# Patient Record
Sex: MALE | Race: BLACK OR AFRICAN AMERICAN | Employment: FULL TIME | URBAN - METROPOLITAN AREA
[De-identification: names, ages, dates, MRNs, and addresses within clinical notes are randomized per-mention and may not be internally consistent; named-entity substitution may affect disease eponyms.]

---

## 2018-01-29 ENCOUNTER — HOSPITAL ENCOUNTER (EMERGENCY)
Age: 58
Discharge: HOME | End: 2018-01-29
Attending: EMERGENCY MEDICINE
Payer: MEDICAID

## 2018-01-29 VITALS
TEMPERATURE: 98.1 F | BODY MASS INDEX: 22.76 KG/M2 | SYSTOLIC BLOOD PRESSURE: 111 MMHG | HEART RATE: 84 BPM | WEIGHT: 145 LBS | RESPIRATION RATE: 16 BRPM | OXYGEN SATURATION: 100 % | DIASTOLIC BLOOD PRESSURE: 75 MMHG | HEIGHT: 67 IN

## 2018-01-29 DIAGNOSIS — J06.9 UPPER RESPIRATORY TRACT INFECTION, UNSPECIFIED TYPE: ICD-10-CM

## 2018-01-29 DIAGNOSIS — K08.89 PAIN, DENTAL: Primary | ICD-10-CM

## 2018-01-29 PROCEDURE — 99282 EMERGENCY DEPT VISIT SF MDM: CPT

## 2018-01-29 RX ORDER — IBUPROFEN 800 MG/1
800 TABLET ORAL EVERY 8 HOURS PRN
Qty: 30 TABLET | Refills: 0 | Status: SHIPPED | OUTPATIENT
Start: 2018-01-29 | End: 2019-03-29 | Stop reason: ALTCHOICE

## 2018-01-29 RX ORDER — BENZONATATE 100 MG/1
100 CAPSULE ORAL 3 TIMES DAILY PRN
Qty: 30 CAPSULE | Refills: 0 | Status: SHIPPED | OUTPATIENT
Start: 2018-01-29 | End: 2018-02-05

## 2018-01-29 RX ORDER — PSEUDOEPHEDRINE HYDROCHLORIDE 30 MG/1
30 TABLET ORAL EVERY 6 HOURS PRN
Qty: 20 TABLET | Refills: 0 | Status: SHIPPED | OUTPATIENT
Start: 2018-01-29 | End: 2018-02-05

## 2018-01-29 ASSESSMENT — ENCOUNTER SYMPTOMS
SINUS PRESSURE: 1
STRIDOR: 0
PHOTOPHOBIA: 0
SINUS PAIN: 1
DIARRHEA: 0
CONSTIPATION: 0
COUGH: 1
VOMITING: 0
SHORTNESS OF BREATH: 0
NAUSEA: 0
RHINORRHEA: 1
SORE THROAT: 0
ABDOMINAL PAIN: 0
WHEEZING: 0
VOICE CHANGE: 0

## 2018-01-29 ASSESSMENT — PAIN SCALES - GENERAL: PAINLEVEL_OUTOF10: 7

## 2018-01-29 ASSESSMENT — PAIN DESCRIPTION - PAIN TYPE: TYPE: ACUTE PAIN

## 2018-01-29 ASSESSMENT — PAIN DESCRIPTION - PROGRESSION: CLINICAL_PROGRESSION: GRADUALLY WORSENING

## 2018-01-29 ASSESSMENT — PAIN DESCRIPTION - ORIENTATION: ORIENTATION: LEFT

## 2018-01-29 ASSESSMENT — PAIN DESCRIPTION - LOCATION: LOCATION: MOUTH

## 2018-01-29 ASSESSMENT — PAIN DESCRIPTION - DESCRIPTORS: DESCRIPTORS: ACHING

## 2018-01-29 ASSESSMENT — PAIN DESCRIPTION - ONSET: ONSET: PROGRESSIVE

## 2018-01-29 NOTE — ED NOTES
[address] [facility]  [address] [facility]  Phone: [phone]  Fax: [phone]    Patient Referral Fax     To:  Patient Referral Coordinator Fax:  [phone]  Referral from:  [facility]  56 y.o.      [phone] (home)      Additional Notes: Tuesday, Jan. 30th @ 0900    Signature: Cynthia Koroma Date: 1/29/18         Amador Coles RN  01/29/18 8098

## 2018-01-29 NOTE — ED PROVIDER NOTES
[address] [facility]     Emergency Department     Faculty Attestation    I performed a history and physical examination of the patient and discussed management with the resident. I reviewed the residents note and agree with the documented findings and plan of care. Any areas of disagreement are noted on the chart. I was personally present for the key portions of any procedures. I have documented in the chart those procedures where I was not present during the key portions. I have reviewed the emergency nurses triage note. I agree with the chief complaint, past medical history, past surgical history, allergies, medications, social and family history as documented unless otherwise noted below. For Physician Assistant/ Nurse Practitioner cases/documentation I have personally evaluated this patient and have completed at least one if not all key elements of the E/M (history, physical exam, and MDM). Additional findings are as noted. Primary Care Physician:  No primary care provider on file. CHIEF COMPLAINT       Chief Complaint   Patient presents with    Dental Pain     pt with c/o left upper dental pain.      Cough     pt c/o productive cough with nasal congestion       RECENT VITALS:   Temp: 98.1 °F (36.7 °C),  Pulse: 84, Resp: 16, BP: 111/75    LABS:  Labs Reviewed - No data to display      PERTINENT ATTENDING PHYSICIAN COMMENTS:    Patient here with dental pain patient has a partially impacted left lower wisdom tooth, no evidence of obvious infection for the mouth is soft he also has cold symptoms     Critical Care          Adeline Lundborg, MD, [facility]  Attending Emergency  Physician              Adeline Lundborg, MD  01/29/18 7639
and wound. Neurological: Negative for dizziness, facial asymmetry, weakness, light-headedness and headaches. PHYSICAL EXAM   (up to 7 for level 4, 8 or more for level 5)      INITIAL VITALS:   /75   Pulse 84   Temp 98.1 °F (36.7 °C) (Oral)   Resp 16   Ht 5' 7\" (1.702 m)   Wt 145 lb (65.8 kg)   SpO2 100%   BMI 22.71 kg/m²     Physical Exam   Constitutional: He is oriented to person, place, and time. He appears well-developed and well-nourished. No distress. HENT:   Head: Normocephalic and atraumatic. Mouth/Throat: Uvula is midline and oropharynx is clear and moist. No oropharyngeal exudate, posterior oropharyngeal edema or posterior oropharyngeal erythema. First molar of the left lower mouth appears to be erupting from the gums and appears to be impacted. No evidence of dental abscess. Patient has tenderness palpation over the maxillary sinuses bilaterally. Eyes: Conjunctivae and EOM are normal. Pupils are equal, round, and reactive to light. Neck: Normal range of motion. Neck supple. Cardiovascular: Normal rate, regular rhythm and normal heart sounds. No murmur heard. Pulmonary/Chest: Effort normal and breath sounds normal. No respiratory distress. He has no wheezes. Abdominal: Soft. He exhibits no distension. There is no tenderness. There is no rebound and no guarding. Neurological: He is alert and oriented to person, place, and time. Skin: He is not diaphoretic. DIFFERENTIAL  DIAGNOSIS     PLAN (LABS / IMAGING / EKG):  No orders of the defined types were placed in this encounter.       MEDICATIONS ORDERED:  Orders Placed This Encounter   Medications    pseudoephedrine (DECONGESTANT) 30 MG tablet     Sig: Take 1 tablet by mouth every 6 hours as needed for Congestion     Dispense:  20 tablet     Refill:  0    ibuprofen (ADVIL;MOTRIN) 800 MG tablet     Sig: Take 1 tablet by mouth every 8 hours as needed for Pain     Dispense:  30 tablet     Refill:  0   

## 2018-03-13 ENCOUNTER — HOSPITAL ENCOUNTER (EMERGENCY)
Age: 58
Discharge: HOME | End: 2018-03-13
Attending: EMERGENCY MEDICINE
Payer: MEDICAID

## 2018-03-13 VITALS
DIASTOLIC BLOOD PRESSURE: 66 MMHG | WEIGHT: 145 LBS | BODY MASS INDEX: 22.76 KG/M2 | SYSTOLIC BLOOD PRESSURE: 107 MMHG | HEART RATE: 60 BPM | TEMPERATURE: 97 F | OXYGEN SATURATION: 99 % | HEIGHT: 67 IN | RESPIRATION RATE: 18 BRPM

## 2018-03-13 DIAGNOSIS — R09.81 NASAL CONGESTION: Primary | ICD-10-CM

## 2018-03-13 PROCEDURE — 99282 EMERGENCY DEPT VISIT SF MDM: CPT

## 2018-03-13 RX ORDER — FLUTICASONE PROPIONATE 50 MCG
1 SPRAY, SUSPENSION (ML) NASAL DAILY
Qty: 1 BOTTLE | Refills: 3 | Status: SHIPPED | OUTPATIENT
Start: 2018-03-13 | End: 2019-03-29 | Stop reason: ALTCHOICE

## 2018-03-13 ASSESSMENT — ENCOUNTER SYMPTOMS
ABDOMINAL PAIN: 0
COLOR CHANGE: 0
CONSTIPATION: 0
RHINORRHEA: 1
COUGH: 0
SINUS PRESSURE: 1
NAUSEA: 0
APNEA: 0
SORE THROAT: 0
VOMITING: 0
SHORTNESS OF BREATH: 0
DIARRHEA: 0

## 2018-03-13 NOTE — ED PROVIDER NOTES
[address] [facility]  ED  Emergency Department Encounter  Emergency Medicine Resident     Pt Name: Isabell Helm  MRN: [id number]  [place] 1960  Date of evaluation: 3/13/18  PCP:  No primary care provider on file. CHIEF COMPLAINT       Chief Complaint   Patient presents with    Nasal Congestion     green drainage x1 month       HISTORY OF PRESENT ILLNESS  (Location/Symptom, Timing/Onset, Context/Setting, Quality, Duration, Modifying Factors, Severity.)      Isabell Helm is a 56 y.o. male who presents with Green nasal drainage and intermittent for approximately one month. He states that it initially started as a cold in the way of any recurrent cold and the now is having drainage approximate 2 weeks since the cold. He states that it is green in nature and he has some pressure within his nasal bridge. He states that the drainage is malodorous. He denies any fevers or chills. Denies any abdominal pain or nausea or vomiting. Denies any cough, shortness of breath, or chest pain. PAST MEDICAL / SURGICAL / SOCIAL / FAMILY HISTORY     Denies any significant past medical or past surgical history    Social History     Social History    Marital status: Single     Spouse name: N/A    Number of children: N/A    Years of education: N/A     Occupational History    Not on file. Social History Main Topics    Smoking status: Current Every Day Smoker     Packs/day: 0.25     Types: Cigarettes    Smokeless tobacco: Not on file    Alcohol use Yes      Comment: socially    Drug use: Yes     Types: Marijuana    Sexual activity: Not on file     Other Topics Concern    Not on file     Social History Narrative    No narrative on file       History reviewed. No pertinent family history. Allergies:  Patient has no known allergies. Home Medications:  Prior to Admission medications    Medication Sig Start Date End Date Taking?  Authorizing Provider   fluticasone (FLONASE) 50 MCG/ACT nasal instructions. PROCEDURES:  None    CONSULTS:  None    CRITICAL CARE:  Please see attending note    FINAL IMPRESSION      1.  Nasal congestion        DISPOSITION / PLAN     DISPOSITION Decision To Discharge 03/13/2018 12:50:38 AM      PATIENT REFERRED TO:  [facility]  [address] [facility] [address]  [phone]  Go to   As needed, If symptoms worsen      DISCHARGE MEDICATIONS:  Discharge Medication List as of 3/13/2018 12:54 AM      START taking these medications    Details   fluticasone (FLONASE) 50 MCG/ACT nasal spray 1 spray by Nasal route daily, Disp-1 Bottle, R-3Print             Denzel Evangelista MD  Emergency Medicine Resident    (Please note that portions of this note were completed with a voice recognition program.  Efforts were made to edit the dictations but occasionally words are mis-transcribed.)       Denzel Evangelista MD  Resident  03/13/18 1142

## 2018-03-13 NOTE — ED PROVIDER NOTES
[facility]     Emergency Department     Faculty Attestation    I performed a history and physical examination of the patient and discussed management with the resident. I have reviewed and agree with the residents findings including all diagnostic interpretations, and treatment plans as written. Any areas of disagreement are noted on the chart. I was personally present for the key portions of any procedures. I have documented in the chart those procedures where I was not present during the key portions. I have reviewed the emergency nurses triage note. I agree with the chief complaint, past medical history, past surgical history, allergies, medications, social and family history as documented unless otherwise noted below. Documentation of the HPI, Physical Exam and Medical Decision Making performed by scribmalcolm is based on my personal performance of the HPI, PE and MDM. For Physician Assistant/ Nurse Practitioner cases/documentation I have personally evaluated this patient and have completed at least one if not all key elements of the E/M (history, physical exam, and MDM). Additional findings are as noted. 56-year-old male complaining of nasal congestion, green drainage. No fever, no vomiting. PE vss, gcs 15, TM clear bilaterally, neck supple, posterior pharynx clear, mild nasal mucosa injection, no mucus/drainage,   Pt appears quite well,       Pre-hypertension/Hypertension: The patient has been informed that they may have pre-hypertension or Hypertension based on a blood pressure reading in the emergency department. I recommend that the patient call the primary care provider listed on their discharge instructions or a physician of their choice this week to arrange follow up for further evaluation of possible pre-hypertension or Hypertension.       EKG Interpretation    Interpreted by me      CRITICAL CARE: There was a high probability of clinically

## 2019-03-29 ENCOUNTER — HOSPITAL ENCOUNTER (EMERGENCY)
Age: 59
Discharge: HOME | End: 2019-03-29
Attending: EMERGENCY MEDICINE

## 2019-03-29 ENCOUNTER — APPOINTMENT (OUTPATIENT)
Dept: GENERAL RADIOLOGY | Age: 59
End: 2019-03-29

## 2019-03-29 VITALS
HEART RATE: 82 BPM | WEIGHT: 147 LBS | HEIGHT: 68 IN | BODY MASS INDEX: 22.28 KG/M2 | TEMPERATURE: 98.9 F | SYSTOLIC BLOOD PRESSURE: 113 MMHG | DIASTOLIC BLOOD PRESSURE: 72 MMHG | OXYGEN SATURATION: 99 % | RESPIRATION RATE: 14 BRPM

## 2019-03-29 DIAGNOSIS — J18.9 ATYPICAL PNEUMONIA: Primary | ICD-10-CM

## 2019-03-29 DIAGNOSIS — J32.0 CHRONIC MAXILLARY SINUSITIS: ICD-10-CM

## 2019-03-29 LAB
DIRECT EXAM: NORMAL
Lab: NORMAL
SPECIMEN DESCRIPTION: NORMAL

## 2019-03-29 PROCEDURE — 71046 X-RAY EXAM CHEST 2 VIEWS: CPT

## 2019-03-29 PROCEDURE — 6370000000 HC RX 637 (ALT 250 FOR IP): Performed by: EMERGENCY MEDICINE

## 2019-03-29 PROCEDURE — 87804 INFLUENZA ASSAY W/OPTIC: CPT

## 2019-03-29 PROCEDURE — 99283 EMERGENCY DEPT VISIT LOW MDM: CPT

## 2019-03-29 RX ORDER — ACETAMINOPHEN 500 MG
500 TABLET ORAL EVERY 6 HOURS PRN
COMMUNITY
End: 2019-03-29 | Stop reason: CLARIF

## 2019-03-29 RX ORDER — AZITHROMYCIN 250 MG/1
TABLET, FILM COATED ORAL
Qty: 1 PACKET | Refills: 0 | Status: SHIPPED | OUTPATIENT
Start: 2019-03-29 | End: 2019-04-02

## 2019-03-29 RX ORDER — ONDANSETRON 4 MG/1
4 TABLET, ORALLY DISINTEGRATING ORAL EVERY 8 HOURS PRN
Qty: 20 TABLET | Refills: 0 | Status: SHIPPED | OUTPATIENT
Start: 2019-03-29 | End: 2019-03-29

## 2019-03-29 RX ORDER — NAPROXEN 250 MG/1
500 TABLET ORAL ONCE
Status: COMPLETED | OUTPATIENT
Start: 2019-03-29 | End: 2019-03-29

## 2019-03-29 RX ORDER — ALBUTEROL SULFATE 90 UG/1
2 AEROSOL, METERED RESPIRATORY (INHALATION) EVERY 4 HOURS PRN
Qty: 1 INHALER | Refills: 0 | Status: SHIPPED | OUTPATIENT
Start: 2019-03-29 | End: 2019-03-29

## 2019-03-29 RX ORDER — NAPROXEN 375 MG/1
375 TABLET ORAL 3 TIMES DAILY PRN
Qty: 20 TABLET | Refills: 0 | Status: SHIPPED | OUTPATIENT
Start: 2019-03-29 | End: 2019-03-29

## 2019-03-29 RX ORDER — ONDANSETRON 4 MG/1
4 TABLET, FILM COATED ORAL EVERY 8 HOURS PRN
Qty: 10 TABLET | Refills: 0 | Status: SHIPPED | OUTPATIENT
Start: 2019-03-29 | End: 2019-04-18

## 2019-03-29 RX ORDER — ONDANSETRON 4 MG/1
4 TABLET, ORALLY DISINTEGRATING ORAL ONCE
Status: COMPLETED | OUTPATIENT
Start: 2019-03-29 | End: 2019-03-29

## 2019-03-29 RX ADMIN — NAPROXEN 500 MG: 250 TABLET ORAL at 14:50

## 2019-03-29 RX ADMIN — ONDANSETRON 4 MG: 4 TABLET, ORALLY DISINTEGRATING ORAL at 14:50

## 2019-03-29 ASSESSMENT — ENCOUNTER SYMPTOMS
SHORTNESS OF BREATH: 1
ABDOMINAL PAIN: 0
SINUS PRESSURE: 1
BACK PAIN: 0
VOMITING: 0
COUGH: 1
NAUSEA: 1
TROUBLE SWALLOWING: 0

## 2019-03-29 ASSESSMENT — PAIN DESCRIPTION - DESCRIPTORS: DESCRIPTORS: ACHING

## 2019-03-29 ASSESSMENT — PAIN DESCRIPTION - PAIN TYPE: TYPE: ACUTE PAIN

## 2019-03-29 ASSESSMENT — PAIN DESCRIPTION - LOCATION: LOCATION: GENERALIZED

## 2019-03-29 ASSESSMENT — PAIN SCALES - GENERAL
PAINLEVEL_OUTOF10: 6
PAINLEVEL_OUTOF10: 6

## 2019-03-29 NOTE — ED PROVIDER NOTES
[address]  eMERGENCY dEPARTMENT eNCOUnter    Pt Name: Javier Jovel  MRN: [id number]  [place] 1960  Date of evaluation: 3/29/19  CHIEF COMPLAINT       Chief Complaint   Patient presents with    Fever    Neck Pain    Generalized Body Aches     HISTORY OF PRESENT ILLNESS   HPI  66 y.o. male presents for evaluation of upper respiratory symptoms. Patient states she's had about 4 days of sore throat, persistent cough, shortness of breath, headaches, sinus pressure, nausea. He is worried he has \"walking pneumonia\" or \"head cold\". He endorses chills and sweating, but unsure of these had a fever or not. He denies any actual vomiting or abdominal pain. She denies any neck pain or stiffness. He has been taking ibuprofen which helps temporarily. REVIEW OF SYSTEMS     Review of Systems   Constitutional: Positive for appetite change, chills, diaphoresis and fatigue. Negative for fever. HENT: Positive for congestion, ear pain and sinus pressure. Negative for trouble swallowing. Respiratory: Positive for cough and shortness of breath. Cardiovascular: Negative for chest pain. Gastrointestinal: Positive for nausea. Negative for abdominal pain and vomiting. Musculoskeletal: Positive for myalgias. Negative for back pain, neck pain and neck stiffness. Skin: Negative for rash. Allergic/Immunologic: Negative for immunocompromised state. Neurological: Negative for light-headedness. Psychiatric/Behavioral: Negative for agitation. PASTMEDICAL HISTORY     Past Medical History:   Diagnosis Date    No active medical problems      SURGICAL HISTORY     History reviewed. No pertinent surgical history. CURRENT MEDICATIONS       Discharge Medication List as of 3/29/2019  3:32 PM      CONTINUE these medications which have NOT CHANGED    Details   Ibuprofen (MOTRIN PO) Take by mouthHistorical Med           ALLERGIES     has No Known Allergies.   FAMILY HISTORY     has no family status information on file. SOCIAL HISTORY       Social History     Tobacco Use    Smoking status: Current Every Day Smoker     Types: Cigarettes    Smokeless tobacco: Never Used   Substance Use Topics    Alcohol use: Not Currently    Drug use: Yes     Types: Marijuana     PHYSICAL EXAM     INITIAL VITALS: /72   Pulse 82   Temp 98.9 °F (37.2 °C) (Oral)   Resp 14   Ht 5' 8\" (1.727 m)   Wt 147 lb (66.7 kg)   SpO2 99%   BMI 22.35 kg/m²    Physical Exam   Constitutional: He is oriented to person, place, and time. He appears well-developed and well-nourished. HENT:   Head: Normocephalic and atraumatic. Right Ear: External ear normal.   Left Ear: External ear normal.   Mouth/Throat: Oropharynx is clear and moist. No oropharyngeal exudate. Very mild amount of erythema in the posterior pharynx without any edema or exudate   Eyes: Pupils are equal, round, and reactive to light. Neck: Normal range of motion. Neck supple. Cardiovascular: Normal rate and regular rhythm. Pulmonary/Chest: Effort normal and breath sounds normal. No respiratory distress. He has no wheezes. Abdominal: Soft. He exhibits no distension. There is no tenderness. Neurological: He is alert and oriented to person, place, and time. Skin: Skin is warm. Capillary refill takes less than 2 seconds. Psychiatric: He has a normal mood and affect. Nursing note and vitals reviewed. MEDICAL DECISION MAKING:     Patient presents with a constellation of symptoms including cough, sinus pressure, nausea, fatigue, shortness of breath. On exam he exhibits unremarkable vital signs, he is afebrile, has clear breath sounds, no signs of otitis media, no signs of significant tonsillar edema although he does have very mild erythema of the tonsils. He is breathing comfortably with no wheezing or crackles. He appears decently well hydrated.     X-ray will be obtained to rule out pneumonia given the length of symptoms along with some systemic signs, he will be for influenza. He otherwise appears stable for discharge with supportive care of a suspected viral infection. ED Course as of Mar 29 1548   Fri Mar 29, 2019   1545 Negative for influenza   DIRECT EXAM.: PRESUMPTIVE NEGATIVE for Influenza A + B antigens. PCR testing to confirm this result is available upon request.  Specimen will be saved in the laboratory for 7 days. Please call [phone] if PCR testing is indicated. [TR]   1546 Chest x-ray shows very mild opacity in the right lung possibly concerning for infection. Given his symptoms, I will go ahead and treat him for community-acquired atypical pneumonia   XR CHEST STANDARD (2 VW) [TR]   1547 Patient is stable for discharge with oral antibiotics. [TR]      ED Course User Index  [TR] Aminah Cobos MD       Procedures    DIAGNOSTIC RESULTS     RADIOLOGY:All plain film, CT, MRI, and formal ultrasound images (except ED bedside ultrasound) are read by the radiologist, see reports below, unless otherwisenoted in MDM or here. XR CHEST STANDARD (2 VW)   Final Result   Subtle hazy opacity right mid lung field may represent developing   infectious/inflammatory process, otherwise negative chest.           LABS: All lab results were reviewed by myself, and all abnormals are listed below. Labs Reviewed   RAPID INFLUENZA A/B ANTIGENS     EMERGENCY DEPARTMENTCOURSE:   Vitals:    Vitals:    03/29/19 1344   BP: 113/72   Pulse: 82   Resp: 14   Temp: 98.9 °F (37.2 °C)   TempSrc: Oral   SpO2: 99%   Weight: 147 lb (66.7 kg)   Height: 5' 8\" (1.727 m)       The patient was given the following medications while in the emergency department:  Orders Placed This Encounter   Medications    ondansetron (ZOFRAN-ODT) disintegrating tablet 4 mg    naproxen (NAPROSYN) tablet 500 mg    azithromycin (ZITHROMAX Z-YULIET) 250 MG tablet     Sig: Take 2 tablets (500 mg) on Day 1, and then take 1 tablet (250 mg) on days 2 through 5. Dispense:  1 packet     Refill:  0    ondansetron (ZOFRAN) 4 MG tablet     Sig: Take 1 tablet by mouth every 8 hours as needed for Nausea     Dispense:  10 tablet     Refill:  0     CONSULTS:  None    FINAL IMPRESSION      1. Atypical pneumonia    2. Chronic maxillary sinusitis          DISPOSITION/PLAN   DISPOSITION Decision To Discharge 03/29/2019 03:22:33 PM      PATIENT REFERRED TO:  No follow-up provider specified. DISCHARGE MEDICATIONS:  Discharge Medication List as of 3/29/2019  3:32 PM      START taking these medications    Details   azithromycin (ZITHROMAX Z-YULIET) 250 MG tablet Take 2 tablets (500 mg) on Day 1, and then take 1 tablet (250 mg) on days 2 through 5., Disp-1 packet, R-0Print      ondansetron (ZOFRAN) 4 MG tablet Take 1 tablet by mouth every 8 hours as needed for Nausea, Disp-10 tablet, R-0Print           Chandra Stinson MD  Attending Emergency Physician  [employer] voice recognition software used in portions of this document.                     Chandra Stinson MD  03/29/19 7285

## 2019-09-10 ENCOUNTER — HOSPITAL ENCOUNTER (OUTPATIENT)
Age: 59
Discharge: HOME | End: 2019-09-10

## 2019-09-10 LAB — RUBV IGG SER QL: 111.5 IU/ML

## 2019-09-10 PROCEDURE — 86735 MUMPS ANTIBODY: CPT

## 2019-09-10 PROCEDURE — 86481 TB AG RESPONSE T-CELL SUSP: CPT

## 2019-09-10 PROCEDURE — 86762 RUBELLA ANTIBODY: CPT

## 2019-09-10 PROCEDURE — 86787 VARICELLA-ZOSTER ANTIBODY: CPT

## 2019-09-10 PROCEDURE — 86765 RUBEOLA ANTIBODY: CPT

## 2019-09-11 LAB
MEASLES IMMUNE (IGG): 2.65
MUV IGG SER QL: 7.99
VZV IGG SER QL IA: 1.33

## 2019-09-13 LAB — T-SPOT TB TEST: NORMAL

## 2022-05-16 ENCOUNTER — HOSPITAL ENCOUNTER (EMERGENCY)
Age: 62
Discharge: HOME | End: 2022-05-16
Attending: EMERGENCY MEDICINE
Payer: MEDICAID

## 2022-05-16 VITALS
DIASTOLIC BLOOD PRESSURE: 75 MMHG | HEART RATE: 67 BPM | TEMPERATURE: 98.1 F | OXYGEN SATURATION: 99 % | SYSTOLIC BLOOD PRESSURE: 110 MMHG | RESPIRATION RATE: 16 BRPM

## 2022-05-16 DIAGNOSIS — H10.022 OTHER MUCOPURULENT CONJUNCTIVITIS OF LEFT EYE: Primary | ICD-10-CM

## 2022-05-16 PROCEDURE — 6370000000 HC RX 637 (ALT 250 FOR IP): Performed by: STUDENT IN AN ORGANIZED HEALTH CARE EDUCATION/TRAINING PROGRAM

## 2022-05-16 PROCEDURE — 2500000003 HC RX 250 WO HCPCS: Performed by: STUDENT IN AN ORGANIZED HEALTH CARE EDUCATION/TRAINING PROGRAM

## 2022-05-16 PROCEDURE — 99283 EMERGENCY DEPT VISIT LOW MDM: CPT

## 2022-05-16 RX ORDER — ERYTHROMYCIN 5 MG/G
OINTMENT OPHTHALMIC ONCE
Status: COMPLETED | OUTPATIENT
Start: 2022-05-16 | End: 2022-05-16

## 2022-05-16 RX ORDER — ERYTHROMYCIN 5 MG/G
OINTMENT OPHTHALMIC
Qty: 1 G | Refills: 0 | Status: SHIPPED | OUTPATIENT
Start: 2022-05-16 | End: 2022-05-26

## 2022-05-16 RX ORDER — PROPARACAINE HYDROCHLORIDE 5 MG/ML
1 SOLUTION/ DROPS OPHTHALMIC ONCE
Status: COMPLETED | OUTPATIENT
Start: 2022-05-16 | End: 2022-05-16

## 2022-05-16 RX ADMIN — ERYTHROMYCIN: 5 OINTMENT OPHTHALMIC at 08:38

## 2022-05-16 RX ADMIN — PROPARACAINE HYDROCHLORIDE 1 DROP: 5 SOLUTION/ DROPS OPHTHALMIC at 08:38

## 2022-05-16 RX ADMIN — FLUORESCEIN SODIUM 1 MG: 1 STRIP OPHTHALMIC at 08:38

## 2022-05-16 ASSESSMENT — ENCOUNTER SYMPTOMS
BACK PAIN: 0
SORE THROAT: 0
EYE PAIN: 1
COUGH: 0
EYE REDNESS: 1
EYE ITCHING: 1
DIARRHEA: 0
EYE DISCHARGE: 1
NAUSEA: 0
ABDOMINAL PAIN: 0
SINUS PAIN: 0
SHORTNESS OF BREATH: 0
PHOTOPHOBIA: 0
VOMITING: 0

## 2022-05-16 NOTE — ED PROVIDER NOTES
[address] [facility]     Emergency Department     Faculty Attestation    I performed a history and physical examination of the patient and discussed management with the resident. I have reviewed and agree with the residents findings including all diagnostic interpretations, and treatment plans as written. Any areas of disagreement are noted on the chart. I was personally present for the key portions of any procedures. I have documented in the chart those procedures where I was not present during the key portions. I have reviewed the emergency nurses triage note. I agree with the chief complaint, past medical history, past surgical history, allergies, medications, social and family history as documented unless otherwise noted below. Documentation of the HPI, Physical Exam and Medical Decision Making performed by karina is based on my personal performance of the HPI, PE and MDM. For Physician Assistant/ Nurse Practitioner cases/documentation I have personally evaluated this patient and have completed at least one if not all key elements of the E/M (history, physical exam, and MDM). Additional findings are as noted. Patient with complaints of irritation to his left eye ongoing since yesterday morning. He woke up and had some crusting to his eyes and then rubbed his eyes and has been having pain since then. Feels like something is still in his eye. No vision change. But does have redness and continued irritation and tearing. No involvement of the right eye. Patient does not wear contacts. Patient with corneal ejection noted to the left eye pupils are equal and reactive, approximately 3 mm bilaterally. No pain with consensual response. No foreign bodies noted to the ocular surface or the superior or inferior mucosal eyelid. Extraocular movements are intact. Will plan for slit-lamp, fluorescein.     Yulisa Hodge D.O, M.P.H  Attending Emergency Medicine Physician         Alden Spain,   05/16/22 0398

## 2022-05-16 NOTE — ED PROVIDER NOTES
Not on file    Ran Out of Food in the Last Year: Not on file   Transportation Needs:     Lack of Transportation (Medical): Not on file    Lack of Transportation (Non-Medical): Not on file   Physical Activity:     Days of Exercise per Week: Not on file    Minutes of Exercise per Session: Not on file   Stress:     Feeling of Stress : Not on file   Social Connections:     Frequency of Communication with Friends and Family: Not on file    Frequency of Social Gatherings with Friends and Family: Not on file    Attends Sikhism Services: Not on file    Active Member of [address] [place] [address] [employer] or Organizations: Not on file    Attends Club or Organization Meetings: Not on file    Marital Status: Not on file   Intimate Partner Violence:     Fear of Current or Ex-Partner: Not on file    Emotionally Abused: Not on file    Physically Abused: Not on file    Sexually Abused: Not on file   Housing Stability:     Unable to Pay for Housing in the Last Year: Not on file    Number of [place] in the Last Year: Not on file    Unstable Housing in the Last Year: Not on file       History reviewed. No pertinent family history. Allergies:  Patient has no known allergies. Home Medications:  Prior to Admission medications    Medication Sig Start Date End Date Taking? Authorizing Provider   erythromycin [facility]) 5 MG/GM ophthalmic ointment Apply thin layer of ointment to the left eye 6 times daily for 7 days 5/16/22 5/26/22 Yes Sarita Luu MD   Ibuprofen (MOTRIN PO) Take by mouth    Historical Provider, MD       REVIEW OF SYSTEMS    (2-9 systems for level 4, 10 or more forlevel 5)      Review of Systems   Constitutional: Negative for activity change, chills and fever. HENT: Negative for congestion, sinus pain and sore throat. Eyes: Positive for pain, discharge, redness and itching. Negative for photophobia and visual disturbance. Respiratory: Negative for cough and shortness of breath.     Cardiovascular: Negative for chest pain. Gastrointestinal: Negative for abdominal pain, diarrhea, nausea and vomiting. Genitourinary: Negative for difficulty urinating, dysuria and hematuria. Musculoskeletal: Negative for back pain and myalgias. Skin: Negative for rash and wound. Neurological: Negative for dizziness, light-headedness and headaches. Psychiatric/Behavioral: Negative for agitation and confusion. PHYSICAL EXAM   (up to 7 for level 4, 8 or more forlevel 5)      ED TRIAGE VITALS BP: 110/75, Temp: 98.1 °F (36.7 °C), Pulse: 67, Resp: 16, SpO2: 99 %    Vitals:    05/16/22 0729   BP: 110/75   Pulse: 67   Resp: 16   Temp: 98.1 °F (36.7 °C)   SpO2: 99%         Physical Exam  Vitals and nursing note reviewed. Constitutional:       Appearance: Normal appearance. HENT:      Head: Normocephalic and atraumatic. Nose: Nose normal.      Mouth/Throat:      Mouth: Mucous membranes are moist.   Eyes:      Extraocular Movements: Extraocular movements intact. Pupils: Pupils are equal, round, and reactive to light. Comments: On exam the left eye is erythematous with increased lacrimal duct secretion, the eyelids were retracted there is no obvious foreign body. Pupils are 4 mm and reactive to light bilaterally. Extraocular movements are intact and nonpainful. The right eye has no erythema, no conjunctival injection and no increased lacrimal secretions. Cardiovascular:      Rate and Rhythm: Normal rate and regular rhythm. Pulses: Normal pulses. Heart sounds: Normal heart sounds. Pulmonary:      Effort: Pulmonary effort is normal.      Breath sounds: Normal breath sounds. Abdominal:      General: Abdomen is flat. Palpations: Abdomen is soft. Musculoskeletal:         General: Normal range of motion. Cervical back: Normal range of motion. Skin:     General: Skin is warm and dry. Capillary Refill: Capillary refill takes less than 2 seconds.    Neurological:      General: No focal deficit present. Mental Status: He is alert and oriented to person, place, and time. Psychiatric:         Mood and Affect: Mood normal.         Behavior: Behavior normal.           DIFFERENTIAL  DIAGNOSIS     PLAN (LABS / IMAGING / EKG):  Orders Placed This Encounter   Procedures    Visual acuity screening       MEDICATIONS ORDERED:  ED Medication Orders (From admission, onward)    Start Ordered     Status Ordering Provider    05/16/22 0845 05/16/22 0833  erythromycin (ROMYCIN) ophthalmic ointment  ONCE         Last MAR action: Given - by Sam Abraham on 05/16/22 at 0838 Chuy CHEN    05/16/22 0745 05/16/22 0738  proparacaine (ALCAINE) 0.5 % ophthalmic solution 1 drop  ONCE         Last MAR action: Given - by Sam Abraham on 05/16/22 at 0838 Chuy CHEN    05/16/22 0745 05/16/22 0738  fluorescein ophthalmic strip 1 mg  ONCE         Last MAR action: Given - by Sam Abraham on 05/16/22 at 0838 Chuy CHEN        DIAGNOSTIC RESULTS / EMERGENCY DEPARTMENT COURSE / MDM     IMPRESSION & INITIAL PLAN:  61-year-old male presenting return to for evaluation of left eye complaint. He reports that he feels like something is in his left eye. Particularly above the left upper eyelid. He noticed the irritation last night woke up this morning with increased discharge from his left eye and continued pain and irritation. On exam the patient has full extraocular movement. Visual acuity 20 out of 20 bilaterally. Slit-lamp exam performed there is no increased fluorescein uptake. As the complaint and discharge is localized to one eye. We will treat for preseptal cellulitis with erythromycin ointment. Provided the patient with ophthalmology follow-up. LABS:  No results found for this visit on 05/16/22. RADIOLOGY:  No orders to display   CONSULTS:  None    CRITICAL CARE:  See attending physician note    FINAL IMPRESSION      1.  Other mucopurulent conjunctivitis of left eye DISPOSITION / PLAN     DISPOSITION Decision To Discharge 05/16/2022 08:33:58 AM      PATIENT REFERRED TO:  Maria Del Carmen Hopkins MD      [id number]      DISCHARGE MEDICATIONS:  Discharge Medication List as of 5/16/2022  8:46 AM      START taking these medications    Details   erythromycin (ROMYCIN) 5 MG/GM ophthalmic ointment Apply thin layer of ointment to the left eye 6 times daily for 7 days, Disp-1 g, R-0, Print           Discharge Medication List as of 5/16/2022  8:46 AM           Katharina Gonzales MD  Emergency Medicine Resident    (Please note that portions of this note were completed with a voice recognition program.  Efforts were made to edit the dictations but occasionally words are mis-transcribed.)       Katharina Gonzales MD  Resident  05/16/22 2968